# Patient Record
Sex: MALE
[De-identification: names, ages, dates, MRNs, and addresses within clinical notes are randomized per-mention and may not be internally consistent; named-entity substitution may affect disease eponyms.]

---

## 2018-04-27 ENCOUNTER — HOSPITAL ENCOUNTER (EMERGENCY)
Dept: HOSPITAL 62 - ER | Age: 23
LOS: 1 days | Discharge: HOME | End: 2018-04-28
Payer: SELF-PAY

## 2018-04-27 DIAGNOSIS — R11.0: ICD-10-CM

## 2018-04-27 DIAGNOSIS — R19.7: Primary | ICD-10-CM

## 2018-04-27 DIAGNOSIS — R10.84: ICD-10-CM

## 2018-04-27 LAB
BASOPHILS # BLD AUTO: 0 10^3/UL (ref 0–0.2)
BASOPHILS NFR BLD AUTO: 0.4 % (ref 0–2)
EOSINOPHIL # BLD AUTO: 0.2 10^3/UL (ref 0–0.6)
EOSINOPHIL NFR BLD AUTO: 3 % (ref 0–6)
ERYTHROCYTE [DISTWIDTH] IN BLOOD BY AUTOMATED COUNT: 13.5 % (ref 11.5–14)
HCT VFR BLD CALC: 44.1 % (ref 37.9–51)
HGB BLD-MCNC: 15 G/DL (ref 13.5–17)
LYMPHOCYTES # BLD AUTO: 2 10^3/UL (ref 0.5–4.7)
LYMPHOCYTES NFR BLD AUTO: 28.3 % (ref 13–45)
MCH RBC QN AUTO: 28.6 PG (ref 27–33.4)
MCHC RBC AUTO-ENTMCNC: 34.1 G/DL (ref 32–36)
MCV RBC AUTO: 84 FL (ref 80–97)
MONOCYTES # BLD AUTO: 0.9 10^3/UL (ref 0.1–1.4)
MONOCYTES NFR BLD AUTO: 12.8 % (ref 3–13)
NEUTROPHILS # BLD AUTO: 4 10^3/UL (ref 1.7–8.2)
NEUTS SEG NFR BLD AUTO: 55.5 % (ref 42–78)
PLATELET # BLD: 135 10^3/UL (ref 150–450)
RBC # BLD AUTO: 5.25 10^6/UL (ref 4.35–5.55)
TOTAL CELLS COUNTED % (AUTO): 100 %
WBC # BLD AUTO: 7.1 10^3/UL (ref 4–10.5)

## 2018-04-27 PROCEDURE — 96361 HYDRATE IV INFUSION ADD-ON: CPT

## 2018-04-27 PROCEDURE — 96374 THER/PROPH/DIAG INJ IV PUSH: CPT

## 2018-04-27 PROCEDURE — 80053 COMPREHEN METABOLIC PANEL: CPT

## 2018-04-27 PROCEDURE — 85025 COMPLETE CBC W/AUTO DIFF WBC: CPT

## 2018-04-27 PROCEDURE — 83690 ASSAY OF LIPASE: CPT

## 2018-04-27 PROCEDURE — 96372 THER/PROPH/DIAG INJ SC/IM: CPT

## 2018-04-27 PROCEDURE — 99284 EMERGENCY DEPT VISIT MOD MDM: CPT

## 2018-04-27 PROCEDURE — 36415 COLL VENOUS BLD VENIPUNCTURE: CPT

## 2018-04-28 VITALS — DIASTOLIC BLOOD PRESSURE: 80 MMHG | SYSTOLIC BLOOD PRESSURE: 131 MMHG

## 2018-04-28 LAB
ADD MANUAL DIFF: NO
ALBUMIN SERPL-MCNC: 4.3 G/DL (ref 3.5–5)
ALP SERPL-CCNC: 75 U/L (ref 38–126)
ALT SERPL-CCNC: 32 U/L (ref 21–72)
ANION GAP SERPL CALC-SCNC: 11 MMOL/L (ref 5–19)
AST SERPL-CCNC: 27 U/L (ref 17–59)
BILIRUB DIRECT SERPL-MCNC: 0.2 MG/DL (ref 0–0.4)
BILIRUB SERPL-MCNC: 0.3 MG/DL (ref 0.2–1.3)
BUN SERPL-MCNC: 9 MG/DL (ref 7–20)
CALCIUM: 9.3 MG/DL (ref 8.4–10.2)
CHLORIDE SERPL-SCNC: 105 MMOL/L (ref 98–107)
CO2 SERPL-SCNC: 29 MMOL/L (ref 22–30)
GLUCOSE SERPL-MCNC: 89 MG/DL (ref 75–110)
LIPASE SERPL-CCNC: 126.8 U/L (ref 23–300)
POTASSIUM SERPL-SCNC: 3.7 MMOL/L (ref 3.6–5)
PROT SERPL-MCNC: 7 G/DL (ref 6.3–8.2)
SODIUM SERPL-SCNC: 145.3 MMOL/L (ref 137–145)

## 2018-04-28 NOTE — ER DOCUMENT REPORT
ED GI/





- General


Chief Complaint: Abdominal Pain


Stated Complaint: ABDOMINAL PAIN


Time Seen by Provider: 04/28/18 00:11


Mode of Arrival: Medic


Information source: Patient


Notes: 





Patient presents complaining of diarrhea for the past 2 days.  Patient also 

complains of nausea with generalized abdominal pain.  Patient reports having 

diarrhea 4 episodes today.  Patient denies any fever.  Patient denies any 

blood in stool.  Patient reports appetite has been normal.  Patient does report 

recently returning to the [place] 3 days ago from [place] after staying 

there for 4 months.


COUNTRY TRAVELED TO/FROM: [facility]


Patient complains to provider of: Abdominal pain, Diarrhea.  No: Vomiting


Onset: Other - 2 days


Timing/Duration: Persistent


Quality of pain: Cramping


Pain Level: 2


Location: Epigastric, Other - Umbilical


Associated symptoms: Diarrhea, Nausea.  denies: Blood in stool, Loss of appetite

, Urinary hesitancy, Urinary frequency, Urinary retention, Vomiting


Exacerbated by: Denies


Relieved by: Denies


Similar symptoms previously: No


Recently seen / treated by doctor: No





- Related Data


Allergies/Adverse Reactions: 


 





No Known Allergies Allergy (Unverified 04/27/18 23:20)


 











Past Medical History





- General


Information source: Patient





- Social History


Smoking Status: Never Smoker


Frequency of alcohol use: None


Drug Abuse: None


Occupation: Retail


Family History: Reviewed & Not Pertinent





- Medical History


Medical History: Negative


Surgical Hx: Negative





Review of Systems





- Review of Systems


Constitutional: No symptoms reported.  denies: Fever, Recent illness


EENT: No symptoms reported


Cardiovascular: No symptoms reported.  denies: Chest pain


Respiratory: No symptoms reported.  denies: Cough, Short of breath


Gastrointestinal: Abdominal pain, Diarrhea, Nausea.  denies: Vomiting, 

Constipation, Poor appetite, Rectal bleeding


Genitourinary: No symptoms reported.  denies: Dysuria, Flank pain


Male Genitourinary: No symptoms reported


Musculoskeletal: No symptoms reported.  denies: Back pain


Skin: No symptoms reported


Hematologic/Lymphatic: No symptoms reported


Neurological/Psychological: No symptoms reported.  denies: Headaches





Physical Exam





- Vital signs


Vitals: 


97.8, 58, 16, 134/83, 98%





- General


General appearance: Appears well, Alert


In distress: None





- HEENT


Head: Normocephalic, Atraumatic


Eyes: Normal


Conjunctiva: Normal


Nasal: Normal


Mouth/Lips: Normal


Mucous membranes: Normal


Pharynx: Normal


Neck: Normal, Supple.  No: Lymphadenopathy





- Respiratory


Respiratory status: No respiratory distress


Chest status: Nontender


Breath sounds: Normal.  No: Productive cough, Rales, Rhonchi, Stridor, Wheezing


Chest palpation: Normal





- Cardiovascular


Rhythm: Regular


Heart sounds: S1 appreciated, S2 appreciated


Murmur: No





- Abdominal


Inspection: Normal


Distension: No distension


Bowel sounds: Normal


Tenderness: Tender - epigastric, umbilical


Organomegaly: No organomegaly





- Back


Back: Normal, Nontender.  No: CVA tenderness





- Extremities


General upper extremity: Normal inspection, Normal ROM


General lower extremity: Normal inspection, Normal ROM





- Neurological


Neuro grossly intact: Yes


Cognition: Normal


[place] Coma Scale Eye Opening: Spontaneous


[place] Coma Scale Verbal: Oriented


[place] Coma Scale Motor: Obeys Commands


[place] Coma Scale Total: 15





- Psychological


Associated symptoms: Normal affect, Normal mood





- Skin


Skin Temperature: Warm


Skin Moisture: Dry


Skin Color: Normal





Course





- Re-evaluation


Re-evalutation: 





04/28/18 01:17


Patient reports that abdominal tenderness is starting to improve.  Patient 

denies any nausea at this time.  Patient has tolerated sips of water without 

any emesis.


04/28/18 01:50


Patient without any abdominal tenderness at this time.  Patient without any 

vomiting or diarrhea during ER stay.  Discussed worsening symptoms of patient 

to return immediately for.  Patient verbalized understanding and is agreeable 

with discharge plan of care at this time.  Patient presents with abdominal pain 

without signs of peritonitis or other life-threatening or serious etiology.  

Patient appears stable for discharge and has been instructed to return 

immediately if the symptoms worsen in any way, or in 8-12 hours if not improved 

for reevaluation.  The patient has been instructed to return if the symptoms 

worsen or change in any way.





- Laboratory


Result Diagrams: 


 04/27/18 20:35





 04/27/18 20:35


Laboratory results interpreted by me: 


 











  04/27/18 04/27/18





  20:35 20:35


 


Plt Count  135 L 


 


Sodium   145.3 H











04/28/18 01:51





 Labs- Entire Visit











  04/27/18 04/27/18 04/27/18





  20:35 20:35 20:35


 


WBC  7.1  


 


RBC  5.25  


 


Hgb  15.0  


 


Hct  44.1  


 


MCV  84  


 


MCH  28.6  


 


MCHC  34.1  


 


RDW  13.5  


 


Plt Count  135 L  


 


Seg Neutrophils %  55.5  


 


Lymphocytes %  28.3  


 


Monocytes %  12.8  


 


Eosinophils %  3.0  


 


Basophils %  0.4  


 


Absolute Neutrophils  4.0  


 


Absolute Lymphocytes  2.0  


 


Absolute Monocytes  0.9  


 


Absolute Eosinophils  0.2  


 


Absolute Basophils  0.0  


 


Sodium   145.3 H 


 


Potassium   3.7 


 


Chloride   105 


 


Carbon Dioxide   29 


 


Anion Gap   11 


 


BUN   9 


 


Creatinine   0.82 


 


Est GFR ( Amer)   > 60 


 


Est GFR (Non-Af Amer)   > 60 


 


Glucose   89 


 


Calcium   9.3 


 


Total Bilirubin   0.3 


 


Direct Bilirubin   0.2 


 


Neonat Total Bilirubin   Not Reportable 


 


Neonat Direct Bilirubin   Not Reportable 


 


Neonat Indirect Bili   Not Reportable 


 


AST   27 


 


ALT   32 


 


Alkaline Phosphatase   75 


 


Total Protein   7.0 


 


Albumin   4.3 


 


Lipase    126.8














Discharge





- Discharge


Clinical Impression: 


 Nausea, Resolved abdominal pain





Diarrhea


Qualifiers:


 Diarrhea type: unspecified type Qualified Code(s): R19.7 - Diarrhea, 

unspecified





Condition: Stable


Disposition: HOME, SELF-CARE


Instructions:  Abdominal Pain ([facility]), Antispasmodics ([facility]), Diarrhea, 

Nonspecific ([facility]), Antinausea Medication ([facility])


Additional Instructions: 


Return immediately for any new or worsening symptoms





Followup with your primary care provider, call tomorrow to make a followup 

appointment








Prescriptions: 


Dicyclomine HCl [Bentyl 20 mg Tablet] 20 mg PO QID #10 tablet


Ondansetron HCl [Zofran 4 mg Tablet] 1 - 2 tab PO Q6 PRN #12 tablet


 PRN Reason: 


Referrals: 


[facility] [Provider Group] - Follow up as needed